# Patient Record
Sex: FEMALE | Race: WHITE | NOT HISPANIC OR LATINO | Employment: FULL TIME | URBAN - METROPOLITAN AREA
[De-identification: names, ages, dates, MRNs, and addresses within clinical notes are randomized per-mention and may not be internally consistent; named-entity substitution may affect disease eponyms.]

---

## 2017-04-20 ENCOUNTER — LAB (OUTPATIENT)
Dept: LAB | Facility: HOSPITAL | Age: 27
End: 2017-04-20

## 2017-04-20 ENCOUNTER — TRANSCRIBE ORDERS (OUTPATIENT)
Dept: LAB | Facility: HOSPITAL | Age: 27
End: 2017-04-20

## 2017-04-20 DIAGNOSIS — N91.1 SECONDARY PHYSIOLOGIC AMENORRHEA: Primary | ICD-10-CM

## 2017-04-20 DIAGNOSIS — N91.1 SECONDARY PHYSIOLOGIC AMENORRHEA: ICD-10-CM

## 2017-04-20 LAB — HCG INTACT+B SERPL-ACNC: 48 MIU/ML

## 2017-04-20 PROCEDURE — 84702 CHORIONIC GONADOTROPIN TEST: CPT

## 2017-04-20 PROCEDURE — 36415 COLL VENOUS BLD VENIPUNCTURE: CPT

## 2017-04-25 ENCOUNTER — LAB (OUTPATIENT)
Dept: LAB | Facility: HOSPITAL | Age: 27
End: 2017-04-25

## 2017-04-25 ENCOUNTER — TRANSCRIBE ORDERS (OUTPATIENT)
Dept: LAB | Facility: HOSPITAL | Age: 27
End: 2017-04-25

## 2017-04-25 DIAGNOSIS — N91.1 SECONDARY PHYSIOLOGIC AMENORRHEA: Primary | ICD-10-CM

## 2017-04-25 DIAGNOSIS — N91.1 SECONDARY PHYSIOLOGIC AMENORRHEA: ICD-10-CM

## 2017-04-25 LAB — HCG INTACT+B SERPL-ACNC: 23 MIU/ML

## 2017-04-25 PROCEDURE — 36415 COLL VENOUS BLD VENIPUNCTURE: CPT

## 2017-04-25 PROCEDURE — 84702 CHORIONIC GONADOTROPIN TEST: CPT | Performed by: OBSTETRICS & GYNECOLOGY

## 2018-09-14 ENCOUNTER — OFFICE VISIT (OUTPATIENT)
Dept: RETAIL CLINIC | Facility: CLINIC | Age: 28
End: 2018-09-14

## 2018-09-14 VITALS
SYSTOLIC BLOOD PRESSURE: 96 MMHG | DIASTOLIC BLOOD PRESSURE: 72 MMHG | BODY MASS INDEX: 23.79 KG/M2 | WEIGHT: 126 LBS | TEMPERATURE: 98.1 F | RESPIRATION RATE: 20 BRPM | HEIGHT: 61 IN | OXYGEN SATURATION: 98 % | HEART RATE: 78 BPM

## 2018-09-14 DIAGNOSIS — L50.9 URTICARIA: Primary | ICD-10-CM

## 2018-09-14 PROCEDURE — 99203 OFFICE O/P NEW LOW 30 MIN: CPT | Performed by: NURSE PRACTITIONER

## 2018-09-14 PROCEDURE — 96372 THER/PROPH/DIAG INJ SC/IM: CPT | Performed by: NURSE PRACTITIONER

## 2018-09-14 RX ORDER — METHYLPREDNISOLONE 4 MG/1
TABLET ORAL
Qty: 1 EACH | Refills: 0 | Status: SHIPPED | OUTPATIENT
Start: 2018-09-14

## 2018-09-14 RX ORDER — DEXAMETHASONE SODIUM PHOSPHATE 10 MG/ML
6 INJECTION, SOLUTION INTRAMUSCULAR; INTRAVENOUS ONCE
Status: COMPLETED | OUTPATIENT
Start: 2018-09-14 | End: 2018-09-14

## 2018-09-14 RX ADMIN — DEXAMETHASONE SODIUM PHOSPHATE 6 MG: 10 INJECTION, SOLUTION INTRAMUSCULAR; INTRAVENOUS at 11:00

## 2018-09-14 NOTE — PATIENT INSTRUCTIONS
Hives  Hives (urticaria) are itchy, red, swollen areas on your skin. Hives can show up on any part of your body, and they can vary in size. They can be as small as the tip of a pen or much larger. Hives often fade within 24 hours (acute hives). In other cases, new hives show up after old ones fade. This can continue for many days or weeks (chronic hives).  Hives are caused by your body's reaction to an irritant or to something that you are allergic to (trigger). You can get hives right after being around a trigger or hours later. Hives do not spread from person to person (are not contagious). Hives may get worse if you scratch them, if you exercise, or if you have worries (emotional stress).  Follow these instructions at home:  Medicines  · Take or apply over-the-counter and prescription medicines only as told by your doctor.  · If you were prescribed an antibiotic medicine, use it as told by your doctor. Do not stop taking the antibiotic even if you start to feel better.  Skin Care  · Apply cool, wet cloths (cool compresses) to the itchy, red, swollen areas.  · Do not scratch your skin. Do not rub your skin.  General instructions  · Do not take hot showers or baths. This can make itching worse.  · Do not wear tight clothes.  · Use sunscreen and wear clothing that covers your skin when you are outside.  · Avoid any triggers that cause your hives. Keep a journal to help you keep track of what causes your hives. Write down:  ? What medicines you take.  ? What you eat and drink.  ? What products you use on your skin.  · Keep all follow-up visits as told by your doctor. This is important.  Contact a doctor if:  · Your symptoms are not better with medicine.  · Your joints are painful or swollen.  Get help right away if:  · You have a fever.  · You have belly pain.  · Your tongue or lips are swollen.  · Your eyelids are swollen.  · Your chest or throat feels tight.  · You have trouble breathing or swallowing.  These  symptoms may be an emergency. Do not wait to see if the symptoms will go away. Get medical help right away. Call your local emergency services (911 in the [place] Do not drive yourself to the hospital.  This information is not intended to replace advice given to you by your health care provider. Make sure you discuss any questions you have with your health care provider.  Document Released: 09/26/2009 Document Revised: 05/25/2017 Document Reviewed: 10/05/2016  [employer] Patient Education © 2018 [employer]    You have been given a steroid shot today  I have also prescribed an oral steroid. Take with food as directed  Follow up with your regular doctor as planned  ER or call 911 if you develop dizziness, swelling, have chest pain or trouble breathing.

## 2020-12-11 NOTE — PROGRESS NOTES
Subjective   Rash    Shawna Rodriguez is a 28 y.o. female who presents with 3 weeks history of itchy body rash. Started as small spot left chest area and has progressed to general thorax, buttocks, right arm, bilateral thighs. Got new tattoo on left arm in early August, otherwise no new exposures. Reports some areas of bruising on bilateral hip regions, has constantly scratched at sites. Itching has interrupted sleep. Had lab work drawn 2 days ago per family friend who is a physician, awaiting results. She took Benadryl, but states had worsening symptoms and discontinued. She has also tried calamine lotion and lidocaine creams without relief.  Denies recent illness, tick or insect bites, general malaise or other signs of illness.    Rash   This is a new problem. The current episode started 1 to 4 weeks ago. The problem has been gradually worsening since onset. The rash is diffuse. The rash is characterized by dryness and itchiness. She was exposed to nothing. Associated symptoms include fatigue (due to lack of adequate sleep). Pertinent negatives include no congestion, cough, facial edema, fever, nail changes, rhinorrhea, shortness of breath or sore throat. Past treatments include anti-itch cream, antihistamine, cold compress and topical steroids.        History Obtained from: Patient    Past Medical History:   Diagnosis Date   • Anxiety    • Depression    • Infectious mononucleosis      Past Surgical History:   Procedure Laterality Date   • ORAL MUCOCELE EXCISION       Social History     Social History   • Marital status: Single     Spouse name: N/A   • Number of children: N/A   • Years of education: N/A     Occupational History   • Not on file.     Social History Main Topics   • Smoking status: Current Some Day Smoker   • Smokeless tobacco: Not on file      Comment: 1-2 cigs daily   • Alcohol use Not on file      Comment: 3/weekly   • Drug use: Unknown   • Sexual activity: Yes     Partners: Male     Birth control/  protection: None     Other Topics Concern   • Not on file     Social History Narrative   • No narrative on file     Family History   Problem Relation Age of Onset   • No Known Problems Mother    • No Known Problems Father      No Known Allergies  Current Outpatient Prescriptions   Medication Sig Dispense Refill   • Multiple Vitamins-Minerals (HAIR SKIN AND NAILS FORMULA PO) Take  by mouth.     • MethylPREDNISolone (MEDROL, SAUL,) 4 MG tablet Take as directed on package instructions. 1 each 0     No current facility-administered medications for this visit.         The following portions of the patient's history were reviewed and updated as appropriate: allergies, current medications, past family history, past medical history, past social history and past surgical history.    Review of Systems   Constitutional: Positive for fatigue (due to lack of adequate sleep). Negative for chills and fever.   HENT: Negative for congestion, ear pain, mouth sores, postnasal drip, rhinorrhea, sore throat, tinnitus, trouble swallowing and voice change.    Eyes: Negative for photophobia, discharge, itching and visual disturbance.   Respiratory: Negative for cough, chest tightness, shortness of breath and wheezing.    Cardiovascular: Negative for chest pain, palpitations and leg swelling.   Gastrointestinal: Negative.    Endocrine: Negative for polydipsia, polyphagia and polyuria.   Genitourinary: Negative for dysuria, flank pain and hematuria.   Musculoskeletal: Negative for arthralgias, joint swelling, myalgias, neck pain and neck stiffness.   Skin: Positive for rash. Negative for nail changes and wound.   Allergic/Immunologic: Negative for environmental allergies and immunocompromised state.   Neurological: Negative for dizziness, weakness, light-headedness and headaches.   Hematological: Negative for adenopathy. Does not bruise/bleed easily.   Psychiatric/Behavioral: Negative for agitation and confusion. The patient is not  "nervous/anxious.         Reports \"stress\"       Objective     VITAL SIGNS:   Vitals:    09/14/18 1025   BP: 96/72   BP Location: Left arm   Patient Position: Sitting   Pulse: 78   Resp: 20   Temp: 98.1 °F (36.7 °C)   TempSrc: Oral   SpO2: 98%   Weight: 57.2 kg (126 lb)   Height: 154.9 cm (61\")   Body mass index is 23.81 kg/m².    Physical Exam   Constitutional: She is cooperative.  Non-toxic appearance. She does not appear ill.   Patient actively scratching skin   HENT:   Head: Normocephalic and atraumatic.   Right Ear: External ear normal.   Left Ear: External ear normal.   Nose: Nose normal.   Mouth/Throat: Uvula is midline and mucous membranes are normal. No uvula swelling. No posterior oropharyngeal edema or posterior oropharyngeal erythema.   Eyes: Pupils are equal, round, and reactive to light. Conjunctivae and EOM are normal. No scleral icterus.   Neck: Normal range of motion and phonation normal. Neck supple. No tracheal deviation present.   Cardiovascular: Normal rate and regular rhythm.    No murmur heard.  Pulmonary/Chest: Effort normal and breath sounds normal.   Musculoskeletal: Normal range of motion. She exhibits no edema, tenderness or deformity.   Lymphadenopathy:     She has no cervical adenopathy.     She has no axillary adenopathy.        Right: No supraclavicular adenopathy present.        Left: No supraclavicular adenopathy present.   Neurological: She is alert. She is not disoriented. Coordination and gait normal.   Skin: Skin is warm and dry. Capillary refill takes less than 2 seconds. Rash (genralized) noted. No petechiae noted. Rash is urticarial. No pallor.   Artificial nails   Psychiatric: Her mood appears anxious (mildly). Her speech is not rapid and/or pressured. She is not agitated. She is attentive.         CLINICAL QUALITY MEASURES:  Tobacco Screening & Intervention Tobacco user, received tobacco cessation counseling. Current some day smoker less than 3 minutes spent counseling Has " reduced [place] use   WEIGHT SCREENING/BMI  Calculated BMI within normal parameteres & documented     Assessment/Plan     Shawna was seen today for rash.    Diagnoses and all orders for this visit:    Urticaria  -     dexamethasone sodium phosphate injection 6 mg; Inject 0.6 mL into the appropriate muscle as directed by prescriber 1 (One) Time.    Other orders  -     MethylPREDNISolone (MEDROL, SAUL,) 4 MG tablet; Take as directed on package instructions.        PLAN:  Patient should follow up with primary care provider. ER evaluation advised if symptoms fail to improve, worsen or for the development of new symptoms that need attention.    The patient voiced understanding and agreement to the patient treatment plan and instructions     CHUY Lazaro          used